# Patient Record
Sex: FEMALE | URBAN - METROPOLITAN AREA
[De-identification: names, ages, dates, MRNs, and addresses within clinical notes are randomized per-mention and may not be internally consistent; named-entity substitution may affect disease eponyms.]

---

## 2024-05-22 ENCOUNTER — NURSE TRIAGE (OUTPATIENT)
Dept: NURSING | Facility: CLINIC | Age: 49
End: 2024-05-22

## 2024-05-23 NOTE — TELEPHONE ENCOUNTER
Uterus removed yesterday and has a urinary leg bag.  Changed the bag at 5 pm and there has been no urine output since.  She is having some abdominal discomfort and swelling, but says she has had that since surgery.    Disposition to be seen within four hours.  Patient verbalizes understanding and agrees with plan.    Adriana West RN  [facility]          Reason for Disposition   [1] No urine in bag > 4 hours AND [2] catheter is not kinked    Additional Information   Negative: Shock suspected (e.g., cold/pale/clammy skin, too weak to stand, low BP, rapid pulse)   Negative: Sounds like a life-threatening emergency to the triager   Negative: [1] Catheter was accidentally pulled-out AND [2] bright red continuous bleeding (or trickling)   Negative: SEVERE abdominal pain   Negative: Fever > 100.4 F (38.0 C)   Negative: [1] Drinking very little AND [2] dehydration suspected (e.g., no urine > 12 hours, very dry mouth, very lightheaded)   Negative: Patient sounds very sick or weak to the triager   Negative: Catheter was accidentally pulled-out   Negative: [1] Catheter is broken AND [2] is not working (does not function normally)   Negative: Bleeding around catheter (e.g., from penis or female urethra)   Negative: New-onset MILD-MODERATE lower abdominal pain or swelling (distention)    Protocols used: Urinary Catheter (e.g., Jacobo) Symptoms and [id number]